# Patient Record
Sex: FEMALE | Race: BLACK OR AFRICAN AMERICAN | HISPANIC OR LATINO | Employment: STUDENT | URBAN - METROPOLITAN AREA
[De-identification: names, ages, dates, MRNs, and addresses within clinical notes are randomized per-mention and may not be internally consistent; named-entity substitution may affect disease eponyms.]

---
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
## 2022-01-17 ENCOUNTER — OFFICE VISIT (OUTPATIENT)
Dept: OBGYN CLINIC | Facility: OTHER | Age: 22
End: 2022-01-17
Payer: COMMERCIAL

## 2022-01-17 VITALS
BODY MASS INDEX: 28.28 KG/M2 | DIASTOLIC BLOOD PRESSURE: 85 MMHG | SYSTOLIC BLOOD PRESSURE: 128 MMHG | HEART RATE: 65 BPM | HEIGHT: 66 IN | WEIGHT: 176 LBS

## 2022-01-17 DIAGNOSIS — M79.605 BILATERAL LEG PAIN: Primary | ICD-10-CM

## 2022-01-17 DIAGNOSIS — S86.899A MEDIAL TIBIAL STRESS SYNDROME, UNSPECIFIED LATERALITY, INITIAL ENCOUNTER: ICD-10-CM

## 2022-01-17 DIAGNOSIS — M79.604 BILATERAL LEG PAIN: Primary | ICD-10-CM

## 2022-01-17 PROCEDURE — 99214 OFFICE O/P EST MOD 30 MIN: CPT | Performed by: ORTHOPAEDIC SURGERY

## 2022-01-17 NOTE — PROGRESS NOTES
Chief Complaint:  Follow-up bilateral leg pain    HPI:    Jl Yoon is a 25year old Female who presents today for follow up of bilateral leg pain    Athlete: Yes, describe: [place]     Injury related: No    If not injury related:gradual starting 2 years ago    Description of symptoms:  Patient presents as for follow-up for bilateral lower leg pain  She was last examined on 12/14/2021 for this, which she was advised to get supplemental vitamin-D and magnesium as her magnesium level was found to be low  Today she reports continued bilateral leg pain  She initially had rest from activity and exercise, but had tried returning to sports 2 weeks ago and had some pain with physical activity  At the time she was performing burpees and jumping jacks  She localizes pain to anterior leg, right worse than left  No pain at rest, worse with activity  She does have occasional tingling down to her feet, but no weakness  She has no history of blood clots, no new injury  Summary of treatment to-date:  Home strengthening program    I have personally reviewed pertinent films in PACS  Patient Active Problem List   Diagnosis    Shin splints    Bilateral leg pain    Bilateral pes planus        Current Outpatient Medications on File Prior to Visit   Medication Sig Dispense Refill    norethindrone-ethinyl estradiol (Destinee Fe 1/20) 1-20 MG-MCG per tablet Take 1 tablet by mouth daily       No current facility-administered medications on file prior to visit          Allergies   Allergen Reactions    Penicillins Rash        Tobacco Use: Low Risk     Smoking Tobacco Use: Never Smoker    Smokeless Tobacco Use: Never Used        Social Determinants of Health     Tobacco Use: Low Risk     Smoking Tobacco Use: Never Smoker    Smokeless Tobacco Use: Never Used   Alcohol Use: Not on file   Financial Resource Strain: Not on file   Food Insecurity: Not on file   Transportation Needs: Not on file Physical Activity: Not on file   Stress: Not on file   Social Connections: Not on file   Intimate Partner Violence: Not on file   Depression: Not on file   Housing Stability: Not on file               Review of Systems   Constitutional: Negative for chills and fever  HENT: Negative for ear pain and sore throat  Eyes: Negative for pain and visual disturbance  Respiratory: Negative for cough and shortness of breath  Cardiovascular: Negative for chest pain and palpitations  Gastrointestinal: Negative for abdominal pain and vomiting  Genitourinary: Negative for dysuria and hematuria  Musculoskeletal: Negative for arthralgias and back pain  Skin: Negative for color change and rash  Neurological: Negative for seizures and syncope  All other systems reviewed and are negative  Body mass index is 28 41 kg/m²  Physical Exam  Vitals and nursing note reviewed  Constitutional:       General: She is not in acute distress  Appearance: She is well-developed  HENT:      Head: Normocephalic and atraumatic  Eyes:      Conjunctiva/sclera: Conjunctivae normal    Cardiovascular:      Rate and Rhythm: Normal rate and regular rhythm  Heart sounds: No murmur heard  Pulmonary:      Effort: Pulmonary effort is normal  No respiratory distress  Breath sounds: Normal breath sounds  Abdominal:      Palpations: Abdomen is soft  Tenderness: There is no abdominal tenderness  Musculoskeletal:      Cervical back: Neck supple  Skin:     General: Skin is warm and dry  Neurological:      Mental Status: She is alert  Ortho Exam:    Body part: bilateral leg    Inspection:  No deformity noted, there is no swelling of bilateral legs    Palpation:Tender to at the medial tibia bilaterally along the mid and distal thirds  Bilateral tenderness over the medial gastrocnemius belly  Range of motion: tight achilles, worse on right compared to left    Reduced ankle dorsiflexion with knee fully extended  Special Tests:  Negative Jefe test bilaterally, dital pulses intact in dorsiflexion and plantar flexion  Distal Neurovascular Status: Intact, Yes    Procedures       Assessment:     Diagnosis ICD-10-CM Associated Orders   1  Bilateral leg pain  M79 604 MRI tibia fibula left wo contrast    M79 605 MRI tibia fibula right wo contrast   2  Medial tibial stress syndrome, unspecified laterality, initial encounter  S86 890P MRI tibia fibula left wo contrast     MRI tibia fibula right wo contrast        Plan:    Discussed clinical findings and exam with Kaylynn  She has not had much improvement of her symptoms since last visit  She has tried conservative management and abstaining from exercise and still has pain at the anterior shin and bilateral calves  We discussed differentials including stress injury versus popliteal artery entrapment syndrome  Symptoms are not consistent with popliteal artery entrapment syndrome at this time as she has distal pulses with ankle in neutral and dorsiflexion position  We have requested an MRI for further evaluation of bilateral lower legs  We will see her back after MRI has been obtained  Follow-Up:    Follow-up after MRI        Portions of the record may have been created with voice recognition software  Occasional wrong word or "sound alike" substitutions may have occurred due to the inherent limitations of voice recognition software  Please review the chart carefully and recognize, using context, where substitutions/typographical errors may have occurred

## 2022-02-28 ENCOUNTER — OFFICE VISIT (OUTPATIENT)
Dept: OBGYN CLINIC | Facility: OTHER | Age: 22
End: 2022-02-28
Payer: COMMERCIAL

## 2022-02-28 VITALS
WEIGHT: 178.6 LBS | BODY MASS INDEX: 28.7 KG/M2 | DIASTOLIC BLOOD PRESSURE: 87 MMHG | HEART RATE: 75 BPM | SYSTOLIC BLOOD PRESSURE: 128 MMHG | HEIGHT: 66 IN

## 2022-02-28 DIAGNOSIS — S86.899A MEDIAL TIBIAL STRESS SYNDROME, UNSPECIFIED LATERALITY, INITIAL ENCOUNTER: Primary | ICD-10-CM

## 2022-02-28 DIAGNOSIS — M21.42 BILATERAL PES PLANUS: ICD-10-CM

## 2022-02-28 DIAGNOSIS — M21.41 BILATERAL PES PLANUS: ICD-10-CM

## 2022-02-28 PROCEDURE — 99213 OFFICE O/P EST LOW 20 MIN: CPT | Performed by: ORTHOPAEDIC SURGERY

## 2022-02-28 NOTE — LETTER
February 28, 2022     Patient: Chelo Maddox   YOB: 2000   Date of Visit: 2/28/2022       To Whom it May Concern:    Chelo Maddox is under my professional care  She was seen in my office on 2/28/2022  She is suggested to wear medial longitudinal arch support and gel heel cushion in her footwear  May gradually return back to sports and gym activities as tolerated  Follow-up after 4 weeks if symptoms significantly persist or worsen       If you have any questions or concerns, please don't hesitate to call           Sincerely,          David Nolen MD        CC: Kaylynn Corcoran

## 2022-02-28 NOTE — PROGRESS NOTES
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 with nearly 2 years of bilateral leg pain  She does report having a history of shin splints in the past   She did also mention bilateral calf tightness with prolonged running and occasional numbness of bilateral feet  She did rehabilitation including stretching and strengthening exercises and activity modification  Workup including lab tests revealed a normal hemoglobin level, normal vitamin-D level, negative sickle cell screen, borderline low ferritin of 100 ng/ml, as well as borderline low magnesium level of 1 4 mg/dL  MRI evaluation of bilateral lower legs were also performed and these did not reveal any abnormality  Today, the patient reports that she does not have any significant pain of her legs at this time since basketball is over and she is not participating in any sports activity  Denies any new injury  Denies any pain at rest   She does mention that her calf and leg pain are precipitated primarily with a running activity and she may occasionally get a sense of numbness in her toes bilaterally  Patient Active Problem List   Diagnosis    Shin splints    Bilateral leg pain    Bilateral pes planus        Current Outpatient Medications on File Prior to Visit   Medication Sig Dispense Refill    norethindrone-ethinyl estradiol (Destinee Fe 1/20) 1-20 MG-MCG per tablet Take 1 tablet by mouth daily       No current facility-administered medications on file prior to visit          Allergies   Allergen Reactions    Penicillins Rash        Tobacco Use: Low Risk     Smoking Tobacco Use: Never Smoker    Smokeless Tobacco Use: Never Used        Social Determinants of Health     Tobacco Use: Low Risk     Smoking Tobacco Use: Never Smoker    Smokeless Tobacco Use: Never Used   Alcohol Use: Not on file   Financial Resource Strain: Not on file   Food Insecurity: Not on file   Transportation Needs: Not on file   Physical Activity: Not on file   Stress: Not on file   Social Connections: Not on file   Intimate Partner Violence: Not on file   Depression: Not on file   Housing Stability: Not on file               Review of Systems     Body mass index is 28 83 kg/m²  Physical Exam  Vitals and nursing note reviewed  Constitutional:       General: She is not in acute distress  Appearance: She is well-developed  HENT:      Head: Normocephalic and atraumatic  Eyes:      Conjunctiva/sclera: Conjunctivae normal    Cardiovascular:      Rate and Rhythm: Normal rate and regular rhythm  Heart sounds: No murmur heard  Pulmonary:      Effort: Pulmonary effort is normal  No respiratory distress  Breath sounds: Normal breath sounds  Abdominal:      Palpations: Abdomen is soft  Tenderness: There is no abdominal tenderness  Musculoskeletal:      Cervical back: Neck supple  Skin:     General: Skin is warm and dry  Neurological:      Mental Status: She is alert  Ortho Exam:    Body part: bilateral leg    Inspection:  Bilateral pes planus and ankle hyperpronation is noted  Palpation:  Mild discomfort with calf palpation on the left side  Also has mild diffuse bilateral anteromedial mid tibial tenderness  Range of motion:  Good range of bilateral knee and ankle motion  Special Tests:  Palpable pulses bilaterally in the feet with ankle neutral and in plantar flexion  Distal Neurovascular Status: Intact, Yes    Procedures       Assessment:     Diagnosis ICD-10-CM Associated Orders   1  Medial tibial stress syndrome, unspecified laterality, initial encounter  X49 228Y    2   Bilateral pes planus  M21 41     M21 42         Plan:    Explained my current clinical findings and reviewed bilateral leg MRI findings with Kaylynn today  There is no evidence of any stress injury or fracture of bilateral legs  She is already taking oral multivitamin supplementation with regards to her borderline low oral iron and magnesium levels  I do suspect that her symptoms are likely secondary to bilateral intermittent shin splints and contributing factors may include bilateral pes planus  I have advised her to wear bilateral gel heel cushion and medial longitudinal arch support in her footwear  She may gradually return back to her sports activities as tolerated  I also explained that if symptoms do significantly recur then we would consider doing bilateral lower extremity dynamic arterial duplex scan to exclude popliteal artery entrapment syndrome as well as potentially chronic exertional compartment pressure testing  These tests were explained to the patient  Patient was also counseled with regards to mildly elevated BMI  I did  that 5-10 lb of weight loss may positively help with her leg pain  Follow-Up:    4 weeks if still symptomatic  Portions of the record may have been created with voice recognition software  Occasional wrong word or "sound alike" substitutions may have occurred due to the inherent limitations of voice recognition software  Please review the chart carefully and recognize, using context, where substitutions/typographical errors may have occurred

## 2022-03-28 ENCOUNTER — OFFICE VISIT (OUTPATIENT)
Dept: OBGYN CLINIC | Facility: OTHER | Age: 22
End: 2022-03-28
Payer: COMMERCIAL

## 2022-03-28 VITALS
HEIGHT: 66 IN | BODY MASS INDEX: 29.67 KG/M2 | DIASTOLIC BLOOD PRESSURE: 89 MMHG | SYSTOLIC BLOOD PRESSURE: 135 MMHG | HEART RATE: 88 BPM | WEIGHT: 184.6 LBS

## 2022-03-28 DIAGNOSIS — M21.41 BILATERAL PES PLANUS: Primary | ICD-10-CM

## 2022-03-28 DIAGNOSIS — M21.42 BILATERAL PES PLANUS: Primary | ICD-10-CM

## 2022-03-28 DIAGNOSIS — S86.899D MEDIAL TIBIAL STRESS SYNDROME, UNSPECIFIED LATERALITY, SUBSEQUENT ENCOUNTER: ICD-10-CM

## 2022-03-28 PROCEDURE — 99213 OFFICE O/P EST LOW 20 MIN: CPT | Performed by: ORTHOPAEDIC SURGERY

## 2022-03-28 NOTE — PROGRESS NOTES
Chief Complaint: ***    HPI:    Jl Yoon is a *** year old {Male Female Bisexual:60668} who presents today for {New/followup:47872}    Athlete: {YES-DESCRIBE/NO:39343}    Injury related: {Yes/No With Yes Description:45359}    If not injury related:{desc; hpi onset:89931}    Description of symptoms: ***    Summary of treatment to-date: {prev rx:934558}    {Imaging Review Statement:2077252361}    Patient Active Problem List   Diagnosis    Shin splints    Bilateral leg pain    Bilateral pes planus        Current Outpatient Medications on File Prior to Visit   Medication Sig Dispense Refill    norethindrone-ethinyl estradiol (Destinee Fe ) 1-20 MG-MCG per tablet Take 1 tablet by mouth daily       No current facility-administered medications on file prior to visit  Allergies   Allergen Reactions    Penicillins Rash        Tobacco Use: Low Risk     Smoking Tobacco Use: Never Smoker    Smokeless Tobacco Use: Never Used        Social Determinants of Health     Tobacco Use: Low Risk     Smoking Tobacco Use: Never Smoker    Smokeless Tobacco Use: Never Used   Alcohol Use: Not on file   Financial Resource Strain: Not on file   Food Insecurity: Not on file   Transportation Needs: Not on file   Physical Activity: Not on file   Stress: Not on file   Social Connections: Not on file   Intimate Partner Violence: Not on file   Depression: Not on file   Housing Stability: Not on file               Review of Systems     Body mass index is 29 8 kg/m²  Physical Exam     Ortho Exam:    Body part: {right/left/bilateral:94797} ***    Inspection: ***    Palpation: ***    Range of motion: ***    Special Tests: ***    Distal Neurovascular Status: Intact, {Yes/No with explanation/N/A:82401}    Procedures       Assessment:    No diagnosis found  Plan:    ***    {VB BMI Counselin}    Follow-Up:    ***        Portions of the record may have been created with voice recognition software   Occasional wrong Spoke with Dilia Morris PA-C, and luz for pt to proceed with heart scan. Order placed.    Informed patient of this and provided him with the number to schedule.    word or "sound alike" substitutions may have occurred due to the inherent limitations of voice recognition software  Please review the chart carefully and recognize, using context, where substitutions/typographical errors may have occurred

## 2022-03-29 NOTE — PROGRESS NOTES
Assessment:       1  Bilateral pes planus    2  Medial tibial stress syndrome, unspecified laterality, subsequent encounter          Plan:        I explained my current clinical findings to Kaylynn today  She reports good improvement of her bilateral leg pain at this time and I have advised her to continue with wearing her gel heel cushion and medial longitudinal arch support in the footwear  She has also suggested to continue with Achilles stretching and eccentric strengthening exercises  I will be happy to see her back in the future if there are any further concerns  Subjective:     Patient ID: Jc Duque is a 25 y o  female  Chief Complaint:    HPI  Kaylynn is a 16-year-old female who is here today for a follow-up of bilateral leg pain  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 for nearly 2 years duration with a history of shin splints  She has had extensive workup in this regard including lab work which revealed a normal hemoglobin level, normal vitamin-D level, negative sickle cell screen, borderline low ferritin for which she is on oral iron supplementation, borderline low magnesium level as well as normal bilateral leg MRIs  At her last office visit on 2/28/2022, she was suggested to wear bilateral longitudinal arch support and gel heel cushion in her footwear  Today, the patient reports that subsequent to wearing the gel heel cushion and the large support her bilateral leg pain has significantly improved  She only has some minimal discomfort of her bilateral legs at this time  Denies any tingling numbness or associated back pain       Social History     Occupational History    Not on file   Tobacco Use    Smoking status: Never Smoker    Smokeless tobacco: Never Used   Vaping Use    Vaping Use: Never used   Substance and Sexual Activity    Alcohol use: Not on file    Drug use: Not on file    Sexual activity: Not on file      Review of Systems        Objective:     Ortho ExamPhysical Exam  Vitals and nursing note reviewed  Constitutional:       Appearance: She is well-developed  HENT:      Head: Normocephalic and atraumatic  Eyes:      Conjunctiva/sclera: Conjunctivae normal       Pupils: Pupils are equal, round, and reactive to light  Cardiovascular:      Rate and Rhythm: Normal rate and regular rhythm  Pulmonary:      Effort: Pulmonary effort is normal  No respiratory distress  Skin:     General: Skin is warm and dry  Findings: No erythema  Neurological:      Mental Status: She is alert and oriented to person, place, and time  Cranial Nerves: No cranial nerve deficit  Psychiatric:         Behavior: Behavior normal          Thought Content: Thought content normal          Judgment: Judgment normal          Right leg exam:  No visible deformity except mild pes planus  Also has mild ankle hyperpronation  Negative Homans sign  Negative Correia test   Only minimal discomfort to palpation over the medial border of the right tibia at the junction of mid and lower 3rd  Well palpable distal pulses  Ankle dorsiflexion is 5° with the knee fully extended  Grade 5/5 strength of the right ankle in all directions  Left leg exam:  No visible deformity except mild pes planus  Also has mild ankle hyperpronation  Negative Homans sign  Negative Correia test   No significant discomfort with palpation of the left tibia  Well palpable distal pulses  Ankle dorsiflexion is 5° with the knee fully extended  Grade 5/5 strength of the left ankle in all directions  I have personally reviewed pertinent films in PACS